# Patient Record
Sex: MALE | Race: WHITE | Employment: UNEMPLOYED | URBAN - METROPOLITAN AREA
[De-identification: names, ages, dates, MRNs, and addresses within clinical notes are randomized per-mention and may not be internally consistent; named-entity substitution may affect disease eponyms.]

---

## 2018-03-21 ENCOUNTER — OFFICE VISIT (OUTPATIENT)
Dept: INTERNAL MEDICINE CLINIC | Age: 48
End: 2018-03-21

## 2018-03-21 VITALS
SYSTOLIC BLOOD PRESSURE: 130 MMHG | OXYGEN SATURATION: 95 % | TEMPERATURE: 99 F | HEART RATE: 60 BPM | DIASTOLIC BLOOD PRESSURE: 78 MMHG | HEIGHT: 71 IN | RESPIRATION RATE: 14 BRPM | WEIGHT: 203.6 LBS | BODY MASS INDEX: 28.5 KG/M2

## 2018-03-21 DIAGNOSIS — Z76.89 ESTABLISHING CARE WITH NEW DOCTOR, ENCOUNTER FOR: Primary | ICD-10-CM

## 2018-03-21 DIAGNOSIS — R06.2 WHEEZING: ICD-10-CM

## 2018-03-21 DIAGNOSIS — Z77.090 HISTORY OF EXPOSURE TO ASBESTOS: ICD-10-CM

## 2018-03-21 DIAGNOSIS — F19.21 DRUG ADDICTION IN REMISSION (HCC): ICD-10-CM

## 2018-03-21 DIAGNOSIS — R06.01 ORTHOPNEA: ICD-10-CM

## 2018-03-21 DIAGNOSIS — R10.11 RUQ ABDOMINAL PAIN: ICD-10-CM

## 2018-03-21 DIAGNOSIS — Z76.89 ESTABLISHING CARE WITH NEW DOCTOR, ENCOUNTER FOR: ICD-10-CM

## 2018-03-21 DIAGNOSIS — R30.0 DYSURIA: ICD-10-CM

## 2018-03-21 DIAGNOSIS — R50.9 LOW GRADE FEVER: ICD-10-CM

## 2018-03-21 DIAGNOSIS — R09.89 CHEST RALES: ICD-10-CM

## 2018-03-21 DIAGNOSIS — R05.8 PRODUCTIVE COUGH: ICD-10-CM

## 2018-03-21 DIAGNOSIS — Z00.00 ENCOUNTER FOR ANNUAL HEALTH EXAMINATION: ICD-10-CM

## 2018-03-21 DIAGNOSIS — R10.32 LLQ ABDOMINAL PAIN: ICD-10-CM

## 2018-03-21 DIAGNOSIS — R06.00 DYSPNEA, UNSPECIFIED TYPE: ICD-10-CM

## 2018-03-21 PROBLEM — K70.30 ALCOHOLIC CIRRHOSIS (HCC): Status: ACTIVE | Noted: 2018-03-21

## 2018-03-21 PROBLEM — Z72.0 TOBACCO ABUSE: Status: ACTIVE | Noted: 2018-03-21

## 2018-03-21 PROBLEM — M54.50 CHRONIC LOW BACK PAIN: Status: ACTIVE | Noted: 2018-03-21

## 2018-03-21 PROBLEM — M17.0 PRIMARY OSTEOARTHRITIS OF BOTH KNEES: Status: ACTIVE | Noted: 2018-03-21

## 2018-03-21 PROBLEM — G89.29 CHRONIC LOW BACK PAIN: Status: ACTIVE | Noted: 2018-03-21

## 2018-03-21 RX ORDER — AMOXICILLIN AND CLAVULANATE POTASSIUM 875; 125 MG/1; MG/1
1 TABLET, FILM COATED ORAL 2 TIMES DAILY
Qty: 20 TAB | Refills: 0 | Status: SHIPPED | OUTPATIENT
Start: 2018-03-21 | End: 2018-03-31

## 2018-03-21 RX ORDER — BENZONATATE 100 MG/1
100 CAPSULE ORAL
Qty: 21 CAP | Refills: 0 | Status: SHIPPED | OUTPATIENT
Start: 2018-03-21 | End: 2018-03-28

## 2018-03-21 RX ORDER — ALBUTEROL SULFATE 90 UG/1
2 AEROSOL, METERED RESPIRATORY (INHALATION)
Qty: 1 INHALER | Refills: 2 | Status: SHIPPED | OUTPATIENT
Start: 2018-03-21 | End: 2019-07-31

## 2018-03-21 NOTE — PROGRESS NOTES
Keyonna Mendez is a 50 y.o.  male and presents with    Chief Complaint   Patient presents with    Establish Care    Cold Symptoms     Patient here for cough with green mucus in morning only, chest congestion, nasal driange, weakness. x 2 weeks        Subjective:  HPI   Mr. Shea Lead Hill presents today to establish care with a new provider. He has not been seen by a provider in some time. 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 He currently works second shift as a  and lives with his fiance. He was brought her by his mother in law who makes his appointments for him. He has complaints today of cold like symptoms that started 2 weeks ago. Started with rhinorrhea, sore throat, was out riding in the rain on motorcycle. OTC theraflu, mucinex, tylenol with no relief, seems like getting better then gets worse. Denies fever at home, hemoptysis, n/v, confusion, dizziness, however positive chills, sweats, coughing up green sputum, chest fullness/tightness, sob with exertion/rest, fatigue. Reports has had TB testing which was negative. Recovering prescription opioid/heroin addict of 6-10 years, clean over last 3-4 years. He is followed by [facility], psychiatry/psychology. He is currently taking Methadone 145 mg. Reports testing completed for Hepatitis B,C and HIV all negative. He is a current smoker of cigarretes, 3 a day, occasional marijuana. He has lost 18 lbs over about last 2 months, however he reports has stopped drinking soda. Additional Concerns: none     ROS   Review of Systems   Constitutional: Positive for chills, diaphoresis, fever, malaise/fatigue and weight loss. HENT: Positive for congestion. Negative for sinus pain. Eyes: Negative.     Respiratory: Positive for cough, sputum production, shortness of breath and stridor. Negative for hemoptysis and wheezing. Cardiovascular: Positive for orthopnea. Negative for chest pain, palpitations, claudication, leg swelling and PND. Chest heaviness with laying on back but relieved with side laying   Gastrointestinal: Positive for constipation and heartburn. Negative for abdominal pain, blood in stool, diarrhea, melena, nausea and vomiting. Genitourinary:        Difficulty getting stream going  positvie odor to urine related to Methadone   Musculoskeletal: Positive for back pain and joint pain. Negative for falls, myalgias and neck pain. Low back pain,   Bilateral knee pain, reports told needs knee replacements   Skin: Negative for itching and rash. Neurological: Negative for dizziness, tingling, tremors, sensory change, speech change, focal weakness, seizures, loss of consciousness, weakness and headaches. Endo/Heme/Allergies: Positive for polydipsia. Does not bruise/bleed easily. Psychiatric/Behavioral: Positive for depression. Negative for hallucinations, memory loss, substance abuse and suicidal ideas. The patient is not nervous/anxious and does not have insomnia. No Known Allergies    Current Outpatient Prescriptions   Medication Sig Dispense Refill    guaiFENesin (MUCINEX) 1,200 mg Ta12 ER tablet Take 1,200 mg by mouth daily.  ACETAMINOPHEN (TYLENOL PO) Take 2 Tabs by mouth daily.  METHADONE HCL (METHADONE PO) Take 145 mg by mouth. Indications: RMaxwell      albuterol (PROVENTIL HFA, VENTOLIN HFA, PROAIR HFA) 90 mcg/actuation inhaler Take 2 Puffs by inhalation every four (4) hours as needed for Wheezing. Indications: BRONCHOSPASM PREVENTION 1 Inhaler 2    benzonatate (TESSALON) 100 mg capsule Take 1 Cap by mouth three (3) times daily as needed for Cough for up to 7 days. 21 Cap 0    amoxicillin-clavulanate (AUGMENTIN) 875-125 mg per tablet Take 1 Tab by mouth two (2) times a day for 10 days.  20 Tab 0       Social History     Social History    Marital status: SINGLE     Spouse name: N/A    Number of children: N/A    Years of education: N/A     Occupational History    Not on file.      Social History Main Topics    Smoking status: Current Every Day Smoker     Packs/day: 0.00     Types: Cigarettes    Smokeless tobacco: Never Used      Comment: started age 13    Alcohol use Yes      Comment: once a week    Drug use: Yes     Special: Prescription      Comment: methadone    Sexual activity: No     Other Topics Concern    Not on file     Social History Narrative       Past Medical History:   Diagnosis Date    Chronic pain     Depression     GERD (gastroesophageal reflux disease)     Headache     resolved    Liver disease     early stages of cirrhosis       Past Surgical History:   Procedure Laterality Date    HX HEENT      tonsilectomy       Family History   Problem Relation Age of Onset    Arrhythmia Mother     Diabetes Mother     No Known Problems Father     No Known Problems Sister     Diabetes Brother     Psychiatric Disorder Brother        Objective:  Vitals:    03/21/18 1326   BP: 130/78   Pulse: 60   Resp: 14   Temp: 99 °F (37.2 °C)   TempSrc: Oral   SpO2: 95%   Weight: 203 lb 9.6 oz (92.4 kg)   Height: 5' 11\" (1.803 m)   PainSc:   0 - No pain       LABS   Results for orders placed or performed during the hospital encounter of 11/06/17   CBC WITH AUTOMATED DIFF   Result Value Ref Range    WBC 7.3 4.0 - 11.0 1000/mm3    RBC 5.12 3.80 - 5.70 M/uL    HGB 17.0 12.4 - 17.2 gm/dl    HCT 47.1 37.0 - 50.0 %    MCV 92.0 80.0 - 98.0 fL    MCH 33.2 23.0 - 34.6 pg    MCHC 36.1 (H) 30.0 - 36.0 gm/dl    PLATELET 432 (L) 448 - 450 1000/mm3    MPV 8.9 6.0 - 10.0 fL    RDW-SD 41.8 35.1 - 43.9      NRBC 0 0 - 0      IMMATURE GRANULOCYTES 0.4 0.0 - 3.0 %    NEUTROPHILS 64.0 34 - 64 %    LYMPHOCYTES 25.8 (L) 28 - 48 %    MONOCYTES 7.5 1 - 13 %    EOSINOPHILS 1.9 0 - 5 %    BASOPHILS 0.4 0 - 3 %   POC TROPONIN-I   Result Value Ref Range Troponin-I 0.00 0.00 - 0.07 ng/ml   POC CHEM8   Result Value Ref Range    Sodium 141 136 - 145 mEq/L    Potassium 4.2 3.5 - 4.9 mEq/L    Chloride 103 98 - 107 mEq/L    CO2, TOTAL 24 21 - 32 mmol/L    Glucose 112 (H) 74 - 106 mg/dL    BUN 14 7 - 25 mg/dl    Creatinine 0.8 0.6 - 1.3 mg/dl    HCT 56 (H) 40 - 54 %    HGB 19.0 (H) 13.0 - 17.2 gm/dl    CALCIUM,IONIZED 4.60 4.40 - 5.40 mg/dL   EKG, 12 LEAD, INITIAL   Result Value Ref Range    Ventricular Rate 88 BPM    Atrial Rate 88 BPM    P-R Interval 142 ms    QRS Duration 88 ms    Q-T Interval 404 ms    QTC Calculation (Bezet) 488 ms    Calculated P Axis 54 degrees    Calculated R Axis 22 degrees    Calculated T Axis 66 degrees    Diagnosis       Normal sinus rhythm  Prolonged QT  When compared with ECG of 15-STEPHANI-2014 11:10,  Minimal criteria for Anterior infarct are no longer present  Criteria for Inferior infarct are no longer present  T wave inversion no longer evident in Inferior leads  Nonspecific T wave abnormality no longer evident in Anterior leads  Nonspecific T wave abnormality now evident in Lateral leads  Confirmed by Terese Vickers M.D., Christiano Nunez. ([id number]) on 11/7/2017 7:19:40 AM         TESTS  none    PE  Physical Exam   Constitutional: He is oriented to person, place, and time. He appears well-developed and well-nourished. No distress. HENT:   Head: Normocephalic and atraumatic. Nose: Nose normal.   Mouth/Throat: Oropharynx is clear and moist. No oropharyngeal exudate. Cerumen impaction bilat, TM not visible   Eyes: Conjunctivae and EOM are normal. Pupils are equal, round, and reactive to light. Right eye exhibits no discharge. Left eye exhibits no discharge. Neck: Normal range of motion. Neck supple. Cardiovascular: Normal rate, regular rhythm, normal heart sounds and intact distal pulses. No murmur heard. Pulmonary/Chest: Effort normal and breath sounds normal. No respiratory distress. He has no wheezes. He has no rales. He exhibits no tenderness. Abdominal: Soft. Bowel sounds are normal. He exhibits no distension and no mass. There is tenderness in the right upper quadrant and left lower quadrant. There is no rebound, no guarding and no CVA tenderness. Striae noted   Musculoskeletal: Normal range of motion. He exhibits no edema, tenderness or deformity. No crepitation, clicks, pops noted on examination today, FROM   Lymphadenopathy:     He has no cervical adenopathy. Neurological: He is alert and oriented to person, place, and time. He has normal reflexes. Coordination normal.   Skin: Skin is warm and dry. No rash noted. He is not diaphoretic. No erythema. No pallor. Psychiatric: He has a normal mood and affect. His behavior is normal. Judgment and thought content normal.   Disheveled, dirty, dressed appropriately   Vitals reviewed. Assessment/Plan:    1. Establish care with a new provider- CPE today. Labs today. Follow up in 3 weeks. 2. Early stages Cirrhosis/LLQ/RUQ pain- Labs ordered. CT abd/pelvis ordered. Hx of reported early stages of cirrhosis, IV drug abuse, prescription drug abuse. Denies other abdominal history. Pain not worse with movement only noted with deep palpation and deep inspiratory breath. Possible gallbladder, advancing cirrhosis. .. Will need GI referral in the future. 3. Recovery addict/Depression- Opioid abuse, PO and IV- Currently attending [address] [place] [address], receives psychology/psychiatry, Methadone 145 mg. UDS every 4 months per patient, reports has been screened for Hepatitis B,C, Tb, HIV all negative. Records requested. 4. Tobacco dependence- The patient was counseled on the dangers of tobacco use, and was advised to quit and would like to quit, he is working with psychology/psychiatry. Reviewed strategies to maximize success, including local smoking cessation programs([facility]). 5. Possible BPH- will work up further at next visit.     6. Bilateral osteoarthritis- knees, will address at future visits. 7. Chronic low back pain- tolerable. Monitor. 8. Respiratory infection viral versus bacterial- Chest xray ordered (low grade fever, symptoms ROS, 18 lb weight loss, smoker, hx asbestos reported), bilateral rales, wheezing. Hx asbestos. Labs ordered. Augmentin x 10 days. Albuterol with spacer Prn use cough/bronchospasm/wheeze/chest tightness. Tessalon PRN cough. Follow up sooner if worsening. Lab review: orders written for new lab studies as appropriate; see orders    Today's Visit:   Diagnoses and all orders for this visit:    1. Establishing care with new doctor, encounter for  -     XR CHEST PA LAT; Future  -     CBC WITH AUTOMATED DIFF; Future  -     METABOLIC PANEL, COMPREHENSIVE; Future  -     HEMOGLOBIN A1C WITH EAG; Future  -     LIPID PANEL; Future  -     MICROALBUMIN, UR, RAND W/ MICROALB/CREAT RATIO; Future  -     TSH 3RD GENERATION; Future  -     T4, FREE; Future  -     URINALYSIS W/ RFLX MICROSCOPIC; Future    2. Dyspnea, unspecified type  -     amoxicillin-clavulanate (AUGMENTIN) 875-125 mg per tablet; Take 1 Tab by mouth two (2) times a day for 10 days. 3. Wheezing  -     amoxicillin-clavulanate (AUGMENTIN) 875-125 mg per tablet; Take 1 Tab by mouth two (2) times a day for 10 days. 4. History of exposure to asbestos    5. Orthopnea  -     NT-PRO BNP; Future    6. LLQ abdominal pain  -     CT ABD PELV W WO CONT; Future    7. RUQ abdominal pain  -     LIPASE; Future  -     CT ABD PELV W WO CONT; Future    8. Productive cough  -     amoxicillin-clavulanate (AUGMENTIN) 875-125 mg per tablet; Take 1 Tab by mouth two (2) times a day for 10 days. 9. Chest rales  -     amoxicillin-clavulanate (AUGMENTIN) 875-125 mg per tablet; Take 1 Tab by mouth two (2) times a day for 10 days. 10. Low grade fever  -     amoxicillin-clavulanate (AUGMENTIN) 875-125 mg per tablet; Take 1 Tab by mouth two (2) times a day for 10 days.     Other orders  -     albuterol (PROVENTIL HFA, VENTOLIN HFA, PROAIR HFA) 90 mcg/actuation inhaler; Take 2 Puffs by inhalation every four (4) hours as needed for Wheezing. Indications: BRONCHOSPASM PREVENTION  -     benzonatate (TESSALON) 100 mg capsule; Take 1 Cap by mouth three (3) times daily as needed for Cough for up to 7 days. Health Maintenance: records requested    I have discussed the diagnosis with the patient and the intended plan as seen in the above orders. The patient has received an after-visit summary and questions were answered concerning future plans. I have discussed medication side effects and warnings with the patient as well. I have reviewed the plan of care with the patient, accepted their input and they are in agreement with the treatment goals. Follow-up Disposition:  Return in about 2 weeks (around 4/4/2018). More than 1/2 of this 60 minute visit was spent in counseling and coordination of care, as described above.     GRETEL Diaz  Internist of [address], [facility].  Phone: [phone]  Fax: [phone]

## 2018-03-21 NOTE — MR AVS SNAPSHOT
[facility] 
 
 
 [address], [address] 
[phone] Patient: Geovanni Early 
MRN: [id number] :1970 Visit Information Date & Time Provider Department Dept. Phone Encounter #  
 3/21/2018  1:30 PM Vidhya Meza NP [facility] [address] [place] [address] [phone] [id number] Follow-up Instructions Return in about 2 weeks (around 2018). Your Appointments 2018  8:00 AM  
Office Visit with Vidhya Meza NP [facility] [address] [place] [address] ([address]) Appt Note: 3 wk f/u  
 [address] [facility], [address] [address] [address] [facility]  
  
   
 [address], [address] Upcoming Health Maintenance Date Due Pneumococcal 19-64 Medium Risk (1 of 1 - PPSV23) 1987 Influenza Age 5 to Adult 2017 DTaP/Tdap/Td series (2 - Td) 2028 Allergies as of 3/21/2018  Review Complete On: 3/21/2018 By: Vidhya Meza NP No Known Allergies Current Immunizations  Never Reviewed Name Date Tdap 2018  2:15 AM  
  
 Not reviewed this visit You Were Diagnosed With   
  
 Codes Comments Establishing care with new doctor, encounter for    -  Primary ICD-10-CM: Z76.89 
ICD-9-CM: V65.8 Dyspnea, unspecified type     ICD-10-CM: R06.00 
ICD-9-CM: 786.09 Wheezing     ICD-10-CM: R06.2 ICD-9-CM: 786.07 History of exposure to asbestos     ICD-10-CM: Z77.090 
ICD-9-CM: V15.84 Orthopnea     ICD-10-CM: R06.01 
ICD-9-CM: 786.02   
 LLQ abdominal pain     ICD-10-CM: R10.32 
ICD-9-CM: 789.04   
 RUQ abdominal pain     ICD-10-CM: R10.11 ICD-9-CM: 789.01 Vitals BP Pulse Temp Resp Height(growth percentile) Weight(growth percentile) 130/78 (BP 1 Location: Left arm, BP Patient Position: Sitting) 60 99 °F (37.2 °C) (Oral) 14 5' 11\" (1.803 m) 203 lb 9.6 oz (92.4 kg) SpO2 BMI Smoking Status 95% 28.4 kg/m2 Current Every Day Smoker Vitals History BMI and BSA Data Body Mass Index Body Surface Area  
 28.4 kg/m 2 2.15 m 2 Preferred Pharmacy Pharmacy Name Phone [facility] [address] [facility] [place] 18 [phone] Your Updated Medication List  
  
   
This list is accurate as of 3/21/18  2:41 PM.  Always use your most recent med list.  
  
  
  
  
 albuterol 90 mcg/actuation inhaler Commonly known as:  PROVENTIL HFA, VENTOLIN HFA, PROAIR HFA Take 2 Puffs by inhalation every four (4) hours as needed for Wheezing. Indications: BRONCHOSPASM PREVENTION  
  
 benzonatate 100 mg capsule Commonly known as:  TESSALON Take 1 Cap by mouth three (3) times daily as needed for Cough for up to 7 days. METHADONE PO Take 145 mg by mouth. Indications: RMaxwell MUCINEX 1,200 mg Ta12 ER tablet Generic drug:  guaiFENesin Take 1,200 mg by mouth daily. TYLENOL PO Take 2 Tabs by mouth daily. Prescriptions Sent to Pharmacy Refills  
 albuterol (PROVENTIL HFA, VENTOLIN HFA, PROAIR HFA) 90 mcg/actuation inhaler 2 Sig: Take 2 Puffs by inhalation every four (4) hours as needed for Wheezing. Indications: BRONCHOSPASM PREVENTION Class: Normal  
 Pharmacy: [facility]  [facility] [address] [facility] [place],[facility] Ph #: [phone] Route: Inhalation  
 benzonatate (TESSALON) 100 mg capsule 0 Sig: Take 1 Cap by mouth three (3) times daily as needed for Cough for up to 7 days. Class: Normal  
 Pharmacy: [facility]  [employer] [address],[facility] Ph #: [phone] Route: Oral  
  
Follow-up Instructions Return in about 2 weeks (around 4/4/2018). To-Do List   
 03/21/2018 Lab:  CBC WITH AUTOMATED DIFF   
  
 03/21/2018 Imaging:  CT ABD PELV W WO CONT 03/21/2018 Lab:  HEMOGLOBIN A1C WITH EAG   
  
 03/21/2018 Lab:  LIPASE   
  
 03/21/2018 Lab:  LIPID PANEL   
  
 03/21/2018 Lab:  METABOLIC PANEL, COMPREHENSIVE   
  
 03/21/2018 Lab:  MICROALBUMIN, UR, RAND W/ MICROALB/CREAT RATIO   
  
 03/21/2018 Lab:  NT-PRO BNP   
  
 03/21/2018 Lab:  T4, FREE   
  
 03/21/2018 Lab:  TSH 3RD GENERATION   
  
 03/21/2018 Lab:  URINALYSIS W/ RFLX MICROSCOPIC   
  
 03/21/2018 Imaging:  XR CHEST PA LAT Introducing [facility]! Ciro Disla introduces [employer] patient portal. Now you can access parts of your medical record, email your doctor's office, and request medication refills online. 1. In your internet browser, go to [email or web address] 2. Click on the First Time User? Click Here link in the Sign In box. You will see the New Member Sign Up page. 3. Enter your [employer] Access Code exactly as it appears below. You will not need to use this code after youve completed the sign-up process. If you do not sign up before the expiration date, you must request a new code. · [employer] Access Code: [email or web address] Expires: 6/19/2018  2:39 PM 
 
4. Enter the last four digits of your Social Security Number (xxxx) and Date of Birth (mm/dd/yyyy) as indicated and click Submit. You will be taken to the next sign-up page. 5. Create a [employer] ID. This will be your [employer] login ID and cannot be changed, so think of one that is secure and easy to remember. 6. Create a [employer] password. You can change your password at any time. 7. Enter your Password Reset Question and Answer. This can be used at a later time if you forget your password. 8. Enter your e-mail address. You will receive e-mail notification when new information is available in [address] 9. Click Sign Up. You can now view and download portions of your medical record.  
10. Click the Download Summary menu link to download a portable copy of your medical information. If you have questions, please visit the Frequently Asked Questions section of the [employer] website. Remember, [employer] is NOT to be used for urgent needs. For medical emergencies, dial 911. Now available from your iPhone and Android! Please provide this summary of care documentation to your next provider. Your primary care clinician is listed as Osiel Bhatia. If you have any questions after today's visit, please call [phone].

## 2018-03-21 NOTE — LETTER
NOTIFICATION RETURN TO WORK / SCHOOL 
 
3/21/2018 2:20 PM 
 
Mr. Abby Meadows 
[address] [place] [address] To Whom It May Concern: 
 
Ramirez Palencia is currently under the care of Veronica Veloz. He will return to work/school on: 3/22/18 If there are questions or concerns please have the patient contact our office. Sincerely, Christiana Loja NP

## 2018-03-21 NOTE — PROGRESS NOTES
1. Have you been to the ER, urgent care clinic or hospitalized since your last visit? NO.     2. Have you seen or consulted any other health care providers outside of the [address] [place] [address] since your last visit (Include any pap smears or colon screening)? NO      Do you have an Advanced Directive? NO    Would you like information on Advanced Directives?  NO

## 2018-03-23 ENCOUNTER — TELEPHONE (OUTPATIENT)
Dept: INTERNAL MEDICINE CLINIC | Age: 48
End: 2018-03-23

## 2018-03-23 NOTE — LETTER
3/29/2018 2:21 PM 
 
Mr. Ja Aguilrea 
[address] [address] We have not been able to reach you by phone. We want to inform you that your recent lab work returned without significant pathology,however,there were calcium oxalate crystals in your urine which could indicate a possible kidney stone. We are aware your CT scan is scheduled for April 2nd, please be sure you keep your appointment for this. Also, you have not obtained your chest x-ray as of today. Please be sure you complete the chest x-ray as well at your earliest convenience. You can go to [address] [place] [address] or [facility] without an appointment or an order as the order will be in your chart. Please call the office if you have any questions. Thank you. Sincerely, Ricky Mejia NP

## 2018-03-23 NOTE — TELEPHONE ENCOUNTER
Please let Irineo Syeda Barrientos know all labs returned without significant pathology however did see calcium oxalate crystals in urine. May have kidney stone. I see the CT abd/pelvis is scheduled for 4/2 so this will tell us if there is a stone. Also the Chest Xray is still pending, please have completed at earliest convenience.

## 2018-04-02 ENCOUNTER — HOSPITAL ENCOUNTER (OUTPATIENT)
Dept: CT IMAGING | Age: 48
Discharge: HOME | End: 2018-04-02
Attending: NURSE PRACTITIONER
Payer: COMMERCIAL

## 2018-04-02 DIAGNOSIS — R10.32 LLQ ABDOMINAL PAIN: ICD-10-CM

## 2018-04-02 DIAGNOSIS — R10.11 RUQ ABDOMINAL PAIN: ICD-10-CM

## 2018-04-02 LAB — CREAT UR-MCNC: 0.8 MG/DL (ref 0.6–1.3)

## 2018-04-02 PROCEDURE — 74011636320 HC RX REV CODE- 636/320: Performed by: NURSE PRACTITIONER

## 2018-04-02 PROCEDURE — 82565 ASSAY OF CREATININE: CPT

## 2018-04-02 PROCEDURE — 74178 CT ABD&PLV WO CNTR FLWD CNTR: CPT

## 2018-04-02 RX ADMIN — IOPAMIDOL 100 ML: 612 INJECTION, SOLUTION INTRAVENOUS at 11:31

## 2018-04-05 ENCOUNTER — TELEPHONE (OUTPATIENT)
Dept: INTERNAL MEDICINE CLINIC | Age: 48
End: 2018-04-05

## 2018-04-05 DIAGNOSIS — R93.5 ABNORMAL CT OF THE ABDOMEN: Primary | ICD-10-CM

## 2018-04-05 NOTE — TELEPHONE ENCOUNTER
Please inform . Marybeth Gentile that his CT scan does not show anything emergent however I would like him to be seen by a GI specialist. Please triage him for appendicitis- RLQ pain, anorexia, n/v, fevers. If no change from my previous note then follow up with GI, if change then send to ER. If concerns please discuss with Dr. Eugene Laguna.

## 2018-04-05 NOTE — TELEPHONE ENCOUNTER
Upon review of the Ct abd/pelvis please have this patient seen by General surgery as soon as possible for abnormal appendix on CT scan.  If he is complaining of increased abdominal pain, fevers, change in stool have report

## 2018-04-12 ENCOUNTER — OFFICE VISIT (OUTPATIENT)
Dept: INTERNAL MEDICINE CLINIC | Age: 48
End: 2018-04-12

## 2018-04-12 ENCOUNTER — TELEPHONE (OUTPATIENT)
Dept: INTERNAL MEDICINE CLINIC | Age: 48
End: 2018-04-12

## 2018-04-12 VITALS
SYSTOLIC BLOOD PRESSURE: 130 MMHG | HEIGHT: 71 IN | WEIGHT: 202 LBS | DIASTOLIC BLOOD PRESSURE: 70 MMHG | HEART RATE: 60 BPM | RESPIRATION RATE: 14 BRPM | TEMPERATURE: 99.1 F | BODY MASS INDEX: 28.28 KG/M2 | OXYGEN SATURATION: 95 %

## 2018-04-12 DIAGNOSIS — Z76.89 ESTABLISHING CARE WITH NEW DOCTOR, ENCOUNTER FOR: ICD-10-CM

## 2018-04-12 DIAGNOSIS — R63.0 ANOREXIA: ICD-10-CM

## 2018-04-12 DIAGNOSIS — K21.9 GASTROESOPHAGEAL REFLUX DISEASE, ESOPHAGITIS PRESENCE NOT SPECIFIED: ICD-10-CM

## 2018-04-12 DIAGNOSIS — R06.02 SHORTNESS OF BREATH: ICD-10-CM

## 2018-04-12 DIAGNOSIS — R10.9 ABDOMINAL PAIN, UNSPECIFIED ABDOMINAL LOCATION: Primary | ICD-10-CM

## 2018-04-12 DIAGNOSIS — R63.4 ABNORMAL WEIGHT LOSS: ICD-10-CM

## 2018-04-12 DIAGNOSIS — R10.9 ABDOMINAL PAIN, UNSPECIFIED ABDOMINAL LOCATION: ICD-10-CM

## 2018-04-12 DIAGNOSIS — R06.2 WHEEZING: ICD-10-CM

## 2018-04-12 DIAGNOSIS — R11.0 NAUSEA: ICD-10-CM

## 2018-04-12 DIAGNOSIS — R30.0 DYSURIA: ICD-10-CM

## 2018-04-12 NOTE — PROGRESS NOTES
1. Have you been to the ER, urgent care clinic or hospitalized since your last visit? NO.     2. Have you seen or consulted any other health care providers outside of the [address] [place] [address] since your last visit (Include any pap smears or colon screening)? NO      Do you have an Advanced Directive? NO    Would you like information on Advanced Directives?  NO

## 2018-04-12 NOTE — MR AVS SNAPSHOT
[address] 
[phone] Patient: Conchita Sanchez 
MRN: [id number] :1970 Visit Information Date & Time Provider Department Dept. Phone Encounter #  
 2018  9:30 AM Jennifer Wylie, ERAN [facility] [phone] [id number] Upcoming Health Maintenance Date Due Pneumococcal 19-64 Medium Risk (1 of 1 - PPSV23) 2018* Influenza Age 5 to Adult 10/12/2019* DTaP/Tdap/Td series (2 - Td) 2028 *Topic was postponed. The date shown is not the original due date. Allergies as of 2018  Review Complete On: 2018 By: Arturo Teran No Known Allergies Current Immunizations  Never Reviewed Name Date Tdap 2018  2:15 AM  
  
 Not reviewed this visit You Were Diagnosed With   
  
 Codes Comments Abdominal pain, unspecified abdominal location    -  Primary ICD-10-CM: R10.9 ICD-9-CM: 789.00 Anorexia     ICD-10-CM: R63.0 ICD-9-CM: 783.0 Nausea     ICD-10-CM: R11.0 ICD-9-CM: 787.02 Abnormal weight loss     ICD-10-CM: R63.4 ICD-9-CM: 783.21 Gastroesophageal reflux disease, esophagitis presence not specified     ICD-10-CM: K21.9 ICD-9-CM: 530.81 Shortness of breath     ICD-10-CM: R06.02 
ICD-9-CM: 786.05 Wheezing     ICD-10-CM: R06.2 ICD-9-CM: 786.07 Establishing care with new doctor, encounter for     ICD-10-CM: Z76.89 
ICD-9-CM: V65.8 Dysuria     ICD-10-CM: R30.0 ICD-9-CM: 842. 1 Vitals BP Pulse Temp Resp Height(growth percentile) Weight(growth percentile) 130/70 (BP 1 Location: Left arm, BP Patient Position: Sitting) 60 99.1 °F (37.3 °C) (Oral) 14 5' 11\" (1.803 m) 202 lb (91.6 kg) SpO2 BMI Smoking Status 95% 28.17 kg/m2 Current Every Day Smoker Vitals History BMI and BSA Data Body Mass Index Body Surface Area  
 28.17 kg/m 2 2.14 m 2 Preferred Pharmacy Pharmacy Name Phone [facility] [address] [employer] Dashawn Hensley 71 25 [facility] [address] 18 [phone] Your Updated Medication List  
  
   
This list is accurate as of 4/12/18 10:30 AM.  Always use your most recent med list.  
  
  
  
  
 albuterol 90 mcg/actuation inhaler Commonly known as:  PROVENTIL HFA, VENTOLIN HFA, PROAIR HFA Take 2 Puffs by inhalation every four (4) hours as needed for Wheezing. Indications: BRONCHOSPASM PREVENTION  
  
 beclomethasone 40 mcg/actuation Aero Commonly known as:  QVAR Take 1 Puff by inhalation two (2) times a day. METHADONE PO Take 145 mg by mouth. Indications: RMaxwell MUCINEX 1,200 mg Ta12 ER tablet Generic drug:  guaiFENesin Take 1,200 mg by mouth daily. TYLENOL PO Take 2 Tabs by mouth daily. Prescriptions Sent to Pharmacy Refills  
 beclomethasone (QVAR) 40 mcg/actuation aero 2 Sig: Take 1 Puff by inhalation two (2) times a day. Class: Normal  
 [facility]: [facility] [address] Ph #: [phone] Route: Inhalation We Performed the Following CBC WITH AUTOMATED DIFF [28747 CPT(R)] HEMOGLOBIN A1C WITH EAG [01680 CPT(R)] HEPATITIS PANEL, ACUTE [24050 CPT(R)] LIPASE [id number] CPT(R)] LIPID PANEL [94860 CPT(R)] METABOLIC PANEL, COMPREHENSIVE [95049 CPT(R)] MICROALBUMIN, UR, RAND W/ MICROALB/CREAT RATIO [id number] CPT(R)] NT-PRO BNP [id number] CPT(R)] PSA, DIAGNOSTIC (PROSTATE SPECIFIC AG) [id number] CPT(R)] T4, FREE [id number] CPT(R)] TSH 3RD GENERATION [60343 CPT(R)] URINALYSIS W/ RFLX MICROSCOPIC [12956 CPT(R)] To-Do List   
 04/12/2018 Lab:  HEPATITIS PANEL, ACUTE Patient Instructions Ranitidine (Zantac, Zantac 150, Zantac 300, Zantac 75) - (By mouth) Why this medicine is used: Treats ulcers, heartburn, and gastroesophageal reflux disease (GERD). Contact a nurse or doctor right away if you have: · Blistering, peeling, red skin rash · Unusual bleeding, bruising, weakness · Dark urine or pale stools · Nausea, vomiting, loss of appetite, stomach pain · Yellow skin or eyes Common side effects: 
· Constipation, diarrhea 
· Headache © 2017 [facility] Information is for End User's use only and may not be sold, redistributed or otherwise used for commercial purposes. Loratadine (By mouth) Loratadine (ajj-T-bf-christy) Treats allergy symptoms and hives. Brand Name(s): Alavert, Allergy, Allergy Relief, Brite-Life Allergy Relief, Children's Claritin, Children's Claritin Allergy, Children's Claritin Chewables, Children's Loratadine, Children's Loratadine Allergy, Claritin, Claritin 24HR, Claritin Liqui-Gels, Claritin RediTabs, [facility]ighbor Loratadine, [facility] Children's Loratadine There may be other brand names for this medicine. When This Medicine Should Not Be Used: You should not use this medicine if you have had an allergic reaction to loratadine. Do not give any over-the-counter (OTC) cough and cold medicine to a baby or child under 3years old. Using these medicines in very young children might cause serious or possibly life-threatening side effects. How to Use This Medicine:  
Tablet, Dissolving Tablet, Liquid, Liquid Filled Capsule, Chewable Tablet · Your doctor will tell you how much of this medicine to use and how often. Do not use more medicine or use it more often than your doctor tells you to. · Follow the instructions on the medicine label if you are using this medicine without a prescription. · After you remove a rapidly disintegrating tablet (Reditab®) from the package, use it right away by putting the tablet on your tongue. The tablet will break up and dissolve quickly. You may take the tablet with or without water. · Measure the oral liquid medicine with a marked measuring spoon, oral syringe, or medicine cup. If a dose is missed: · If you miss a dose or forget to take your medicine, take it as soon as you can. If it is almost time for your next dose, wait until then to take the medicine and skip the missed dose. · Do not use extra medicine to make up for a missed dose. How to Store and Dispose of This Medicine: · Store the medicine at room temperature, away from heat and direct light. Do not freeze. · Ask your pharmacist, doctor, or health caregiver about the best way to dispose of any outdated medicine or medicine no longer needed. · Keep all medicine out of the reach of children and never share your medicine with anyone. Drugs and Foods to Avoid: Ask your doctor or pharmacist before using any other medicine, including over-the-counter medicines, vitamins, and herbal products. Warnings While Using This Medicine: · Make sure your doctor knows if you are pregnant or breastfeeding, or if you have liver disease or kidney disease. Possible Side Effects While Using This Medicine:  
Call your doctor right away if you notice any of these side effects: · Allergic reaction: Itching or hives, swelling in face or hands, swelling or tingling in the mouth or throat, tightness in chest, trouble breathing · Extreme weakness · Fast or irregular heartbeat · Uncontrolled movements of the head, neck, eyes or tongue If you notice these less serious side effects, talk with your doctor: · Cough, sore throat, hoarseness · Drowsiness · Dry mouth 
· Headache · Nervousness · Red, irritated eyes If you notice other side effects that you think are caused by this medicine, tell your doctor. Call your doctor for medical advice about side effects. You may report side effects to FDA at 9-064-FDA-3874 © 2017 [facility] Information is for End User's use only and may not be sold, redistributed or otherwise used for commercial purposes. The above information is an  only. It is not intended as medical advice for individual conditions or treatments. Talk to your doctor, nurse or pharmacist before following any medical regimen to see if it is safe and effective for you. Introducing \[facility] [place] [facility]\"" & HEALTH SERVICES! Jose Membreno introduces [facility] patient portal. Now you can access parts of your medical record, email your doctor's office, and request medication refills online. 1. In your internet browser, go to [email or web address] 2. Click on the First Time User? Click Here link in the Sign In box. You will see the New Member Sign Up page. 3. Enter your [facility] Access Code exactly as it appears below. You will not need to use this code after youve completed the sign-up process. If you do not sign up before the expiration date, you must request a new code. · [facility] [facility] Access Code: [email or web address] Expires: 6/19/2018  2:39 PM 
 
4. Enter the last four digits of your Social Security Number (xxxx) and Date of Birth (mm/dd/yyyy) as indicated and click Submit. You will be taken to the next sign-up page. 5. Create a [facility] ID. This will be your [facility] [facility] login ID and cannot be changed, so think of one that is secure and easy to remember. 6. Create a [facility] password. You can change your password at any time. 7. Enter your Password Reset Question and Answer. This can be used at a later time if you forget your password. 8. Enter your e-mail address. You will receive e-mail notification when new information is available in [address] 9. Click Sign Up. You can now view and download portions of your medical record. 10. Click the Download Summary menu link to download a portable copy of your medical information.  
 
If you have questions, please visit the Frequently Asked Questions section of the [employer]. Remember, [employer] is NOT to be used for urgent needs. For medical emergencies, dial 911. Now available from your iPhone and Android! Please provide this summary of care documentation to your next provider. Your primary care clinician is listed as Kalyan Franksor. If you have any questions after today's visit, please call [phone].

## 2018-04-12 NOTE — PROGRESS NOTES
Barbara Russo is a 50 y.o.  male and presents with    Chief Complaint   Patient presents with    Follow-up     Patient here for 3 week follow up. 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 He currently works second shift as a  and lives with his fiance. He was brought here by his mother in law who makes his appointments for him.       Recovering prescription opioid/heroin addict of 6-10 years, clean over last 3-4 years. He is followed by [facility], psychiatry/psychology. He is currently taking Methadone 145 mg. Reports testing completed for Hepatitis B,C and HIV all negative.     He is a current smoker of cigarettes, 3 a day, occasional marijuana.       He reports a weight loss of about 60-70 lbs over the last year. He has lost 18 lbs since January. Additional symptoms include anorexia, intermittent nausea in am.  He figured weight loss related to improved diet. He reports has stopped drinking soda and cut out fast foods. Hx of acid reflux has not tried OTC medicatoin. History of asbestos exposure, IV and PO drug abuse, current everyday smoker. Additional Concerns: none     ROS   Review of Systems   Constitutional: Negative. HENT: Negative. Eyes: Negative. Respiratory: Positive for wheezing. Cardiovascular: Negative. Gastrointestinal: Negative. Genitourinary: Negative. Musculoskeletal: Negative. Skin: Negative. Neurological: Negative. No Known Allergies    Current Outpatient Prescriptions   Medication Sig Dispense Refill    beclomethasone (QVAR) 40 mcg/actuation aero Take 1 Puff by inhalation two (2) times a day. 1 Inhaler 2    ACETAMINOPHEN (TYLENOL PO) Take 2 Tabs by mouth daily.  METHADONE HCL (METHADONE PO) Take 145 mg by mouth.  Indications: RMaxwell      albuterol (PROVENTIL HFA, VENTOLIN HFA, PROAIR HFA) 90 mcg/actuation inhaler Take 2 Puffs by inhalation every four (4) hours as needed for Wheezing. Indications: BRONCHOSPASM PREVENTION 1 Inhaler 2    guaiFENesin (MUCINEX) 1,200 mg Ta12 ER tablet Take 1,200 mg by mouth daily. Social History     Social History    Marital status: SINGLE     Spouse name: N/A    Number of children: N/A    Years of education: N/A     Occupational History    Not on file. Social History Main Topics    Smoking status: Current Every Day Smoker     Packs/day: 0.00     Types: Cigarettes    Smokeless tobacco: Never Used      Comment: started age 13    Alcohol use Yes      Comment: once a week    Drug use: Yes     Special: Prescription      Comment: methadone    Sexual activity: No     Other Topics Concern    Not on file     Social History Narrative       Past Medical History:   Diagnosis Date    Chronic pain     Depression     GERD (gastroesophageal reflux disease)     Headache     resolved    Liver disease     early stages of cirrhosis       Past Surgical History:   Procedure Laterality Date    HX HEENT      tonsilectomy       Family History   Problem Relation Age of Onset    Arrhythmia Mother     Diabetes Mother     No Known Problems Father     No Known Problems Sister     Diabetes Brother     Psychiatric Disorder Brother        Objective:  Vitals:    04/12/18 0926   BP: 130/70   Pulse: 60   Resp: 14   Temp: 99.1 °F (37.3 °C)   TempSrc: Oral   SpO2: 95%   Weight: 202 lb (91.6 kg)   Height: 5' 11\" (1.803 m)   PainSc:   0 - No pain       LABS   Results for orders placed or performed during the hospital encounter of 04/02/18   POC CREATININE   Result Value Ref Range    Creatinine, POC 0.8 0.6 - 1.3 MG/DL    GFRAA, POC >60 >60 ml/min/1.73m2    GFRNA, POC >60 >60 ml/min/1.73m2       TESTS  11/2017 Chest Xray- Atelectasis of lung.     PE  Physical Exam   Constitutional: He is oriented to person, place, and time. He appears well-developed and well-nourished. No distress. HENT:   Head: Normocephalic and atraumatic. Eyes: Conjunctivae and EOM are normal. Pupils are equal, round, and reactive to light. Cardiovascular: Normal rate, regular rhythm, normal heart sounds and intact distal pulses. Pulmonary/Chest: Effort normal and breath sounds normal.   Abdominal: Soft. Bowel sounds are normal.   Neurological: He is alert and oriented to person, place, and time. Skin: Skin is warm and dry. He is not diaphoretic. Psychiatric: He has a normal mood and affect. His behavior is normal. Judgment and thought content normal.   Vitals reviewed. Assessment/Plan:    1. Patient here for follow up to labs and imaging-reviewed with patient. He did not complete the labs last visit and reports not able to complete chest xray last visit as HBV reported no order. Labs completed today in office. Low grade fever and generalized wheezing auscultated today. Prescribed Qvar 40 mcg BID and continue Albuterol PRN. May need PFTs in future, current everyday smoker, hx asbestos, reports TB testing completed at [facility] clinic was negative, working on getting records. Xray has been ordered and patient reports will complete by this weekend. Follow up to be determined after labs and imaging resulted. 2. Abdominal pain/abnormal CT abd/pelvis/anorexia/significant weight loss- GI referral has already been placed. Lab review: orders written for new lab studies as appropriate; see orders    Today's Visit:   Diagnoses and all orders for this visit:    1. Abdominal pain, unspecified abdominal location  -     HEPATITIS PANEL, ACUTE; Future  -     beclomethasone (QVAR) 40 mcg/actuation aero; Take 1 Puff by inhalation two (2) times a day.   -     CBC WITH AUTOMATED DIFF  -     METABOLIC PANEL, COMPREHENSIVE  -     HEMOGLOBIN A1C WITH EAG  -     LIPID PANEL  -     MICROALBUMIN, UR, RAND W/ MICROALB/CREAT RATIO  -     TSH 3RD GENERATION  -     T4, FREE  -     URINALYSIS W/ RFLX MICROSCOPIC  -     NT-PRO BNP  -     LIPASE    2. Anorexia  -     HEPATITIS PANEL, ACUTE; Future  -     beclomethasone (QVAR) 40 mcg/actuation aero; Take 1 Puff by inhalation two (2) times a day. -     CBC WITH AUTOMATED DIFF  -     METABOLIC PANEL, COMPREHENSIVE  -     HEMOGLOBIN A1C WITH EAG  -     LIPID PANEL  -     MICROALBUMIN, UR, RAND W/ MICROALB/CREAT RATIO  -     TSH 3RD GENERATION  -     T4, FREE  -     URINALYSIS W/ RFLX MICROSCOPIC  -     NT-PRO BNP  -     LIPASE    3. Nausea  -     HEPATITIS PANEL, ACUTE; Future  -     beclomethasone (QVAR) 40 mcg/actuation aero; Take 1 Puff by inhalation two (2) times a day. -     CBC WITH AUTOMATED DIFF  -     METABOLIC PANEL, COMPREHENSIVE  -     HEMOGLOBIN A1C WITH EAG  -     LIPID PANEL  -     MICROALBUMIN, UR, RAND W/ MICROALB/CREAT RATIO  -     TSH 3RD GENERATION  -     T4, FREE  -     URINALYSIS W/ RFLX MICROSCOPIC  -     NT-PRO BNP  -     LIPASE    4. Abnormal weight loss  -     HEPATITIS PANEL, ACUTE; Future  -     beclomethasone (QVAR) 40 mcg/actuation aero; Take 1 Puff by inhalation two (2) times a day. -     CBC WITH AUTOMATED DIFF  -     METABOLIC PANEL, COMPREHENSIVE  -     HEMOGLOBIN A1C WITH EAG  -     LIPID PANEL  -     MICROALBUMIN, UR, RAND W/ MICROALB/CREAT RATIO  -     TSH 3RD GENERATION  -     T4, FREE  -     URINALYSIS W/ RFLX MICROSCOPIC  -     NT-PRO BNP  -     LIPASE    5. Gastroesophageal reflux disease, esophagitis presence not specified  -     HEPATITIS PANEL, ACUTE; Future  -     beclomethasone (QVAR) 40 mcg/actuation aero; Take 1 Puff by inhalation two (2) times a day. -     CBC WITH AUTOMATED DIFF  -     METABOLIC PANEL, COMPREHENSIVE  -     HEMOGLOBIN A1C WITH EAG  -     LIPID PANEL  -     MICROALBUMIN, UR, RAND W/ MICROALB/CREAT RATIO  -     TSH 3RD GENERATION  -     T4, FREE  -     URINALYSIS W/ RFLX MICROSCOPIC  -     NT-PRO BNP  -     LIPASE    6.  Shortness of breath  - HEPATITIS PANEL, ACUTE; Future  -     beclomethasone (QVAR) 40 mcg/actuation aero; Take 1 Puff by inhalation two (2) times a day. -     CBC WITH AUTOMATED DIFF  -     METABOLIC PANEL, COMPREHENSIVE  -     HEMOGLOBIN A1C WITH EAG  -     LIPID PANEL  -     MICROALBUMIN, UR, RAND W/ MICROALB/CREAT RATIO  -     TSH 3RD GENERATION  -     T4, FREE  -     URINALYSIS W/ RFLX MICROSCOPIC  -     NT-PRO BNP  -     LIPASE    7. Wheezing  -     HEPATITIS PANEL, ACUTE; Future  -     beclomethasone (QVAR) 40 mcg/actuation aero; Take 1 Puff by inhalation two (2) times a day. -     CBC WITH AUTOMATED DIFF  -     METABOLIC PANEL, COMPREHENSIVE  -     HEMOGLOBIN A1C WITH EAG  -     LIPID PANEL  -     MICROALBUMIN, UR, RAND W/ MICROALB/CREAT RATIO  -     TSH 3RD GENERATION  -     T4, FREE  -     URINALYSIS W/ RFLX MICROSCOPIC  -     NT-PRO BNP  -     LIPASE    8. Establishing care with new doctor, encounter for  -     HEPATITIS PANEL, ACUTE; Future  -     beclomethasone (QVAR) 40 mcg/actuation aero; Take 1 Puff by inhalation two (2) times a day. -     CBC WITH AUTOMATED DIFF  -     METABOLIC PANEL, COMPREHENSIVE  -     HEMOGLOBIN A1C WITH EAG  -     LIPID PANEL  -     MICROALBUMIN, UR, RAND W/ MICROALB/CREAT RATIO  -     TSH 3RD GENERATION  -     T4, FREE  -     URINALYSIS W/ RFLX MICROSCOPIC  -     NT-PRO BNP  -     LIPASE    9. Dysuria  -     PSA - PROSTATE SPECIFIC AG      Health Maintenance:   Pneumococcal vaccine offered and patient agreed to receive however the clinic is out of stock at this time, will address at follow up  Influenza vaccine- out of season. I have discussed the diagnosis with the patient and the intended plan as seen in the above orders. The patient has received an after-visit summary and questions were answered concerning future plans. I have discussed medication side effects and warnings with the patient as well.  I have reviewed the plan of care with the patient, accepted their input and they are in agreement with the treatment goals. Follow-up Disposition: Not on File   More than 1/2 of this 30 minute visit was spent in counseling and coordination of care, as described above.     GRETEL Walton  Internist of [address], [address] [place] [address].  Phone: [phone]  Fax: [phone]

## 2018-04-12 NOTE — LETTER
4/12/2018 11:35 AM 
 
Mr. Nawaf Lombardo 
[address] [facility] [address] Dear Mr. Maria De Jesus Anaya: 
 
Lola Sanderson tried to reach you! We have been unsuccessful in obtaining a copy of your medical records from [facility]. Please contact them to get a copy of your record so we can continue with your care. Sincerely, Bruce Lopez NP

## 2018-04-12 NOTE — TELEPHONE ENCOUNTER
[facility] needs patient records from [facility]. Release was faxed 3/22/18 but no response received so far. I tried to call their office at [phone] yesterday and was unable to get connected with anyone. Fax has again been tried several times and will not go through now. [facility] patient told her he would get his records if we need him to so please contact patient and see if he can get those records.

## 2018-04-12 NOTE — TELEPHONE ENCOUNTER
Per pharmacy, QVAR is no longer available. The pharmacy asks you to consider changing to [address]. Please sign the pended order if appropriate. Requested Prescriptions     Pending Prescriptions Disp Refills    beclomethasone dipropionate (QVAR REDIHALER) 40 mcg/actuation HFAb inhaler 1 Inhaler 2     Sig: Take 1 Puff by inhalation two (2) times a day.

## 2018-04-12 NOTE — TELEPHONE ENCOUNTER
Tried to call pt. Cell number is a woman's cell and home number just disconnects. I sent letter to pt asking him to get a copy of the record.

## 2018-04-12 NOTE — PATIENT INSTRUCTIONS
Ranitidine (Zantac, Zantac 150, Zantac 300, Zantac 75) - (By mouth)   Why this medicine is used:   Treats ulcers, heartburn, and gastroesophageal reflux disease (GERD). Contact a nurse or doctor right away if you have:  · Blistering, peeling, red skin rash  · Unusual bleeding, bruising, weakness  · Dark urine or pale stools  · Nausea, vomiting, loss of appetite, stomach pain  · Yellow skin or eyes     Common side effects:  · Constipation, diarrhea  · Headache  © 2017 [facility] Information is for End User's use only and may not be sold, redistributed or otherwise used for commercial purposes. Loratadine (By mouth)   Loratadine (dak-H-hx-christy)  Treats allergy symptoms and hives. Brand Name(s): Alavert, Allergy, Allergy Relief, Brite-Life Allergy Relief, Children's Claritin, Children's Claritin Allergy, Children's Claritin Chewables, Children's Loratadine, Children's Loratadine Allergy, Claritin, Claritin 24HR, Claritin Liqui-Gels, Claritin RediTabs, [facility] Loratadine, [facility] Children's Loratadine   There may be other brand names for this medicine. When This Medicine Should Not Be Used: You should not use this medicine if you have had an allergic reaction to loratadine. Do not give any over-the-counter (OTC) cough and cold medicine to a baby or child under 3years old. Using these medicines in very young children might cause serious or possibly life-threatening side effects. How to Use This Medicine:   Tablet, Dissolving Tablet, Liquid, Liquid Filled Capsule, Chewable Tablet  · Your doctor will tell you how much of this medicine to use and how often. Do not use more medicine or use it more often than your doctor tells you to. · Follow the instructions on the medicine label if you are using this medicine without a prescription. · After you remove a rapidly disintegrating tablet (Reditab®) from the package, use it right away by putting the tablet on your tongue.  The tablet will break up and dissolve quickly. You may take the tablet with or without water. · Measure the oral liquid medicine with a marked measuring spoon, oral syringe, or medicine cup. If a dose is missed:   · If you miss a dose or forget to take your medicine, take it as soon as you can. If it is almost time for your next dose, wait until then to take the medicine and skip the missed dose. · Do not use extra medicine to make up for a missed dose. How to Store and Dispose of This Medicine:   · Store the medicine at room temperature, away from heat and direct light. Do not freeze. · Ask your pharmacist, doctor, or health caregiver about the best way to dispose of any outdated medicine or medicine no longer needed. · Keep all medicine out of the reach of children and never share your medicine with anyone. Drugs and Foods to Avoid:      Ask your doctor or pharmacist before using any other medicine, including over-the-counter medicines, vitamins, and herbal products. Warnings While Using This Medicine:   · Make sure your doctor knows if you are pregnant or breastfeeding, or if you have liver disease or kidney disease. Possible Side Effects While Using This Medicine:   Call your doctor right away if you notice any of these side effects:  · Allergic reaction: Itching or hives, swelling in face or hands, swelling or tingling in the mouth or throat, tightness in chest, trouble breathing  · Extreme weakness  · Fast or irregular heartbeat  · Uncontrolled movements of the head, neck, eyes or tongue  If you notice these less serious side effects, talk with your doctor:   · Cough, sore throat, hoarseness  · Drowsiness  · Dry mouth  · Headache  · Nervousness  · Red, irritated eyes  If you notice other side effects that you think are caused by this medicine, tell your doctor. Call your doctor for medical advice about side effects.  You may report side effects to FDA at 3-379-DSP-7083  © 2017 [facility] Information is for End User's use only and may not be sold, redistributed or otherwise used for commercial purposes. The above information is an  only. It is not intended as medical advice for individual conditions or treatments. Talk to your doctor, nurse or pharmacist before following any medical regimen to see if it is safe and effective for you.

## 2018-04-13 LAB
A-G RATIO,AGRAT: 1.5 RATIO (ref 1.1–2.6)
ABSOLUTE LYMPHOCYTE COUNT, 10803: 1.6 K/UL (ref 1–4.8)
ALBUMIN SERPL-MCNC: 4.4 G/DL (ref 3.5–5)
ALP SERPL-CCNC: 73 U/L (ref 25–115)
ALT SERPL-CCNC: 28 U/L (ref 5–40)
ANION GAP SERPL CALC-SCNC: 13 MMOL/L
AST SERPL W P-5'-P-CCNC: 41 U/L (ref 10–37)
AVG GLU, 10930: 99 MG/DL (ref 91–123)
BASOPHILS # BLD: 0 K/UL (ref 0–0.2)
BASOPHILS NFR BLD: 0 % (ref 0–2)
BILIRUB SERPL-MCNC: 0.7 MG/DL (ref 0.2–1.2)
BILIRUB UR QL: NEGATIVE
BNP SERPL-MCNC: 48 PG/ML
BUN SERPL-MCNC: 14 MG/DL (ref 6–22)
CALCIUM SERPL-MCNC: 9.2 MG/DL (ref 8.4–10.4)
CHLORIDE SERPL-SCNC: 99 MMOL/L (ref 98–110)
CHOLEST SERPL-MCNC: 115 MG/DL (ref 110–200)
CO2 SERPL-SCNC: 28 MMOL/L (ref 20–32)
CREAT SERPL-MCNC: 0.7 MG/DL (ref 0.5–1.2)
CREATININE, URINE: 98 MG/DL
EOSINOPHIL # BLD: 0.2 K/UL (ref 0–0.5)
EOSINOPHIL NFR BLD: 4 % (ref 0–6)
ERYTHROCYTE [DISTWIDTH] IN BLOOD BY AUTOMATED COUNT: 13.8 % (ref 10–15.5)
GFRAA, 66117: >60
GFRNA, 66118: >60
GLOBULIN,GLOB: 3 G/DL (ref 2–4)
GLUCOSE SERPL-MCNC: 109 MG/DL (ref 70–99)
GLUCOSE UR QL: NEGATIVE MG/DL
GRANULOCYTES,GRANS: 64 % (ref 40–75)
HBA1C MFR BLD HPLC: 5.1 % (ref 4.8–5.9)
HCT VFR BLD AUTO: 43 % (ref 39.3–51.6)
HCV AB SER IA-ACNC: POSITIVE
HDLC SERPL-MCNC: 2.7 MG/DL (ref 0–5)
HDLC SERPL-MCNC: 43 MG/DL (ref 40–59)
HEP A AB,IGM, 006734: NORMAL
HEP B CORE AB,IGM, 016881: NORMAL
HEP B SURFACE AG SCRN, 006510: NORMAL
HGB BLD-MCNC: 14.1 G/DL (ref 13.1–17.2)
HGB UR QL STRIP: NEGATIVE
KETONES UR QL STRIP.AUTO: NEGATIVE MG/DL
LDLC SERPL CALC-MCNC: 55 MG/DL (ref 50–99)
LEUKOCYTE ESTERASE: NEGATIVE
LIPASE SERPL-CCNC: 28 U/L (ref 7–60)
LYMPHOCYTES, LYMLT: 26 % (ref 20–45)
MCH RBC QN AUTO: 33 PG (ref 26–34)
MCHC RBC AUTO-ENTMCNC: 33 G/DL (ref 31–36)
MCV RBC AUTO: 100 FL (ref 80–95)
MICROALB/CREAT RATIO, 140286: NORMAL MCG/MG OF CREATININE (ref 0–30)
MICROALBUMIN,URINE RANDOM 140054: <12 UG/ML (ref 0.1–17)
MONOCYTES # BLD: 0.4 K/UL (ref 0.1–1)
MONOCYTES NFR BLD: 6 % (ref 3–12)
NEUTROPHILS # BLD AUTO: 3.9 K/UL (ref 1.8–7.7)
NITRITE UR QL STRIP.AUTO: NEGATIVE
PH UR STRIP: 5 PH (ref 5–8)
PLATELET # BLD AUTO: 101 K/UL (ref 140–440)
PMV BLD AUTO: 9.8 FL (ref 9–13)
POTASSIUM SERPL-SCNC: 4.3 MMOL/L (ref 3.5–5.5)
PROT SERPL-MCNC: 7.4 G/DL (ref 6.4–8.3)
PROT UR QL STRIP: NEGATIVE MG/DL
PSA SERPL-MCNC: 0.37 NG/ML
RBC # BLD AUTO: 4.3 M/UL (ref 3.8–5.8)
SODIUM SERPL-SCNC: 140 MMOL/L (ref 133–145)
SP GR UR: 1.02 (ref 1–1.03)
T4 FREE SERPL-MCNC: 1.1 NG/DL (ref 0.9–1.8)
TRIGL SERPL-MCNC: 84 MG/DL (ref 40–149)
TSH SERPL DL<=0.005 MIU/L-ACNC: 1.29 MCU/ML (ref 0.27–4.2)
UROBILINOGEN UR STRIP-MCNC: <2 MG/DL
VLDLC SERPL CALC-MCNC: 17 MG/DL (ref 8–30)
WBC # BLD AUTO: 6.1 K/UL (ref 4–11)

## 2018-04-16 ENCOUNTER — TELEPHONE (OUTPATIENT)
Dept: INTERNAL MEDICINE CLINIC | Age: 48
End: 2018-04-16

## 2018-04-16 NOTE — TELEPHONE ENCOUNTER
Please inform Mr. Freddy Leigh that his labs returned showing a low platelet count and positive for hepatitis C. We had already discussed referral to GI and this would be the next step, please schedule with them as soon as possible to discuss options. Also please complete the chest Xray.

## 2018-04-16 NOTE — LETTER
4/18/2018 3:35 PM 
 
Mr. Juan Stacy 
[address] [address] We have not been able to reach you by phone. Please call our office for your recent test results. Thank you. Sincerely, Wendy Edmonds NP

## 2018-06-13 ENCOUNTER — APPOINTMENT (OUTPATIENT)
Dept: GENERAL RADIOLOGY | Age: 48
End: 2018-06-13
Attending: NURSE PRACTITIONER
Payer: COMMERCIAL

## 2018-06-13 ENCOUNTER — HOSPITAL ENCOUNTER (EMERGENCY)
Age: 48
Discharge: HOME | End: 2018-06-14
Attending: EMERGENCY MEDICINE
Payer: COMMERCIAL

## 2018-06-13 VITALS
TEMPERATURE: 97.7 F | SYSTOLIC BLOOD PRESSURE: 117 MMHG | OXYGEN SATURATION: 98 % | DIASTOLIC BLOOD PRESSURE: 75 MMHG | HEART RATE: 60 BPM | RESPIRATION RATE: 16 BRPM
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
## 2018-06-13 DIAGNOSIS — S42.022A CLOSED DISPLACED FRACTURE OF SHAFT OF LEFT CLAVICLE, INITIAL ENCOUNTER: Primary | ICD-10-CM

## 2018-06-13 DIAGNOSIS — T14.8XXA ABRASION: ICD-10-CM

## 2018-06-13 PROCEDURE — 74011250637 HC RX REV CODE- 250/637: Performed by: NURSE PRACTITIONER

## 2018-06-13 PROCEDURE — 99283 EMERGENCY DEPT VISIT LOW MDM: CPT

## 2018-06-13 PROCEDURE — 73000 X-RAY EXAM OF COLLAR BONE: CPT

## 2018-06-13 PROCEDURE — 73030 X-RAY EXAM OF SHOULDER: CPT

## 2018-06-13 PROCEDURE — 73630 X-RAY EXAM OF FOOT: CPT

## 2018-06-13 RX ORDER — OXYCODONE AND ACETAMINOPHEN 5; 325 MG/1; MG/1
2 TABLET ORAL
Status: COMPLETED | OUTPATIENT
Start: 2018-06-13 | End: 2018-06-13

## 2018-06-13 RX ORDER — OXYCODONE AND ACETAMINOPHEN 5; 325 MG/1; MG/1
1 TABLET ORAL
Qty: 15 TAB | Refills: 0 | Status: SHIPPED | OUTPATIENT
Start: 2018-06-13 | End: 2019-07-31

## 2018-06-13 RX ORDER — NALOXONE HYDROCHLORIDE 4 MG/.1ML
SPRAY NASAL
Qty: 2 EACH | Refills: 0 | Status: SHIPPED | OUTPATIENT
Start: 2018-06-13 | End: 2019-07-31

## 2018-06-13 RX ORDER — IBUPROFEN 800 MG/1
800 TABLET ORAL
Qty: 20 TAB | Refills: 0 | Status: SHIPPED | OUTPATIENT
Start: 2018-06-13 | End: 2018-06-20

## 2018-06-13 RX ADMIN — OXYCODONE HYDROCHLORIDE AND ACETAMINOPHEN 2 TABLET: 5; 325 TABLET ORAL at 20:32
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
## 2018-06-14 NOTE — ED NOTES
Pt. Had abrasions on foot and knee cleaned, covered, and wrapped. Pt given d/c papers. Pt had narcan use and administration explained. He had no further questions. Pt appreciative of care.
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
## 2018-06-27 PROBLEM — S42.002A CLOSED LEFT CLAVICULAR FRACTURE: Status: ACTIVE | Noted: 2018-06-27

## 2019-07-31 ENCOUNTER — HOSPITAL ENCOUNTER (EMERGENCY)
Age: 49
Discharge: HOME | End: 2019-07-31
Attending: EMERGENCY MEDICINE
Payer: MEDICAID

## 2019-07-31 ENCOUNTER — APPOINTMENT (OUTPATIENT)
Dept: GENERAL RADIOLOGY | Age: 49
End: 2019-07-31
Attending: PHYSICIAN ASSISTANT
Payer: MEDICAID

## 2019-07-31 VITALS
DIASTOLIC BLOOD PRESSURE: 56 MMHG | OXYGEN SATURATION: 95 % | RESPIRATION RATE: 19 BRPM | TEMPERATURE: 98.7 F | HEART RATE: 85 BPM | SYSTOLIC BLOOD PRESSURE: 118 MMHG

## 2019-07-31 DIAGNOSIS — J45.41 MODERATE PERSISTENT REACTIVE AIRWAY DISEASE WITH ACUTE EXACERBATION: Primary | ICD-10-CM

## 2019-07-31 LAB
ATRIAL RATE: 63 BPM
CALCULATED P AXIS, ECG09: 14 DEGREES
CALCULATED R AXIS, ECG10: 39 DEGREES
CALCULATED T AXIS, ECG11: 38 DEGREES
DIAGNOSIS, 93000: NORMAL
P-R INTERVAL, ECG05: 122 MS
Q-T INTERVAL, ECG07: 428 MS
QRS DURATION, ECG06: 88 MS
QTC CALCULATION (BEZET), ECG08: 437 MS
VENTRICULAR RATE, ECG03: 63 BPM

## 2019-07-31 PROCEDURE — 99285 EMERGENCY DEPT VISIT HI MDM: CPT

## 2019-07-31 PROCEDURE — 74011000250 HC RX REV CODE- 250: Performed by: PHYSICIAN ASSISTANT

## 2019-07-31 PROCEDURE — 71046 X-RAY EXAM CHEST 2 VIEWS: CPT

## 2019-07-31 PROCEDURE — 74011636637 HC RX REV CODE- 636/637: Performed by: PHYSICIAN ASSISTANT

## 2019-07-31 PROCEDURE — 94640 AIRWAY INHALATION TREATMENT: CPT

## 2019-07-31 PROCEDURE — 77030029684 HC NEB SM VOL KT MONA -A

## 2019-07-31 PROCEDURE — 93005 ELECTROCARDIOGRAM TRACING: CPT

## 2019-07-31 RX ORDER — AZITHROMYCIN 250 MG/1
TABLET, FILM COATED ORAL
Qty: 6 TAB | Refills: 0 | Status: SHIPPED | OUTPATIENT
Start: 2019-07-31 | End: 2019-07-31

## 2019-07-31 RX ORDER — PREDNISONE 10 MG/1
TABLET ORAL
Qty: 21 TAB | Refills: 0 | Status: SHIPPED | OUTPATIENT
Start: 2019-07-31 | End: 2019-08-14 | Stop reason: ALTCHOICE

## 2019-07-31 RX ORDER — IPRATROPIUM BROMIDE AND ALBUTEROL SULFATE 2.5; .5 MG/3ML; MG/3ML
3 SOLUTION RESPIRATORY (INHALATION)
Status: COMPLETED | OUTPATIENT
Start: 2019-07-31 | End: 2019-07-31

## 2019-07-31 RX ORDER — AZITHROMYCIN 250 MG/1
TABLET, FILM COATED ORAL
Qty: 6 TAB | Refills: 0 | Status: SHIPPED | OUTPATIENT
Start: 2019-07-31 | End: 2019-08-14 | Stop reason: ALTCHOICE

## 2019-07-31 RX ORDER — PREDNISONE 20 MG/1
60 TABLET ORAL
Status: COMPLETED | OUTPATIENT
Start: 2019-07-31 | End: 2019-07-31

## 2019-07-31 RX ORDER — BENZONATATE 200 MG/1
200 CAPSULE ORAL
Qty: 30 CAP | Refills: 0 | Status: SHIPPED | OUTPATIENT
Start: 2019-07-31 | End: 2019-08-07

## 2019-07-31 RX ORDER — ALBUTEROL SULFATE 90 UG/1
2 AEROSOL, METERED RESPIRATORY (INHALATION)
Qty: 1 INHALER | Refills: 0 | Status: SHIPPED | OUTPATIENT
Start: 2019-07-31 | End: 2019-08-14 | Stop reason: ALTCHOICE

## 2019-07-31 RX ADMIN — IPRATROPIUM BROMIDE AND ALBUTEROL SULFATE 3 ML: .5; 3 SOLUTION RESPIRATORY (INHALATION) at 13:03

## 2019-07-31 RX ADMIN — PREDNISONE 60 MG: 20 TABLET ORAL at 10:45

## 2019-07-31 RX ADMIN — IPRATROPIUM BROMIDE AND ALBUTEROL SULFATE 3 ML: .5; 3 SOLUTION RESPIRATORY (INHALATION) at 10:45

## 2019-07-31 NOTE — DISCHARGE INSTRUCTIONS
SPECIFIC PATIENT INSTRUCTIONS FROM THE PROVIDER  WHO TREATED YOU IN THE ER TODAY:    Prednisone as prescribed until finished. Use your albuterol inhaler, if prescribed, and/or home nebulizer machine (if you have one) for wheezing. Avoid triggers if possible. Finish antibiotics Z-pack as directed. FOLLOW UP APPOINTMENT:    Make an appointment with pulmonologist Dr. Emil Morton for further evaluation and treatment   Follow up with your primary doctor as needed for recheck your symptoms and for any other general concerns about your health.     Return for any urgent concerns or worsening of condition(s)

## 2019-07-31 NOTE — ED PROVIDER NOTES
[facility]  [facility] EMERGENCY DEPT      46 y.o. male with noted past medical history who presents to the emergency department c/o chest tightness, SOB, wheezing, NP cough present chronically but getting much worse since yesterday. 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 Now works for [employer] and keeps failing pulmonary tests. Notes that yesterday felt like \"somewone is choking me and sitting on my chest\". Pt was rx with Advair in the past per records but does not recall that. History of hospitalization/intubation: no  Exposure to smoking: no, +former smoker, quit 1 year ago  Exposure to commercial dust, chemicals, toxic materials: yes    No other complaints.      Past Medical History:   Diagnosis Date    Chronic pain     Depression     GERD (gastroesophageal reflux disease)     Headache     resolved    Liver disease     early stages of cirrhosis       No Known Allergies    Social History     Socioeconomic History    Marital status: SINGLE     Spouse name: Not on file    Number of children: Not on file    Years of education: Not on file    Highest education level: Not on file   Occupational History    Not on file   Social Needs    Financial resource strain: Not on file    Food insecurity:     Worry: Not on file     Inability: Not on file    Transportation needs:     Medical: Not on file     Non-medical: Not on file   Tobacco Use    Smoking status: Current Every Day Smoker     Packs/day: 0.00     Types: Cigarettes    Smokeless tobacco: Never Used    Tobacco comment: started age 13   Substance and Sexual Activity    Alcohol use: Yes     Comment: once a week    Drug use: Yes     Types: Prescription     Comment: methadone    Sexual activity: Never     Partners: Female   Lifestyle    Physical activity:     Days per week: Not on file     Minutes per session: Not on file    Stress: Not on file   Relationships    Social connections:     Talks on phone: Not on file     Gets together: Not on file     Attends [employer] service: Not on file     Active member of club or organization: Not on file     Attends meetings of clubs or organizations: Not on file     Relationship status: Not on file    Intimate partner violence:     Fear of current or ex partner: Not on file     Emotionally abused: Not on file     Physically abused: Not on file     Forced sexual activity: Not on file   Other Topics Concern    Not on file   Social History Narrative    Not on file       REVIEW OF SYSTEMS:  Constitutional:  Negative for fever, chills, diaphoresis. HENT:  Negative for congestion, ear pain, sore throat, rhinorrhea, neck pain/stiffness. Respiratory:  + wheezing, chest tightness, cough Negative for stridor. Cardiovascular:  Negative for chest pain or palpitations. Gastrointestinal:  Negative for abd pain, nausea, vomiting or diarrhea. Genitourinary:  Negative. Musculoskeletal:  Negative. Skin:  Negative for cyanosis or pallor. Neurological: Negative. All other systems are negative    Visit Vitals  /70   Pulse (!) 52   Temp 98.7 °F (37.1 °C)   Resp 20   SpO2 96%       PHYSICAL EXAM:    Nursing notes and vital signs are reviewed    CONSTITUTIONAL:  Alert, in no apparent distress;  well developed;  well nourished. HEAD:  Normocephalic, atraumatic. EYES:  EOMI. Non-icteric sclera. Normal conjunctiva. ENTM:  Nose:  no rhinorrhea. Throat:  no erythema or exudate, mucous membranes moist. Ears: normal TM's bilaterally  NECK:  No JVD. Supple, No rigidity, erythema, edema, muscular or midline tenderness     RESPIRATORY:  Expiratory wheezes but good air movement. +rales in lower lobes. +deep cough. Speaking in full sentences. No tripoding    CARDIOVASCULAR:  Regular rate and rhythm (bradycardia in 50s is nl for this pt per prior records). No murmurs, rubs, or gallops.  No edema, no cords, no asymmetry  GI:  Normal bowel sounds, abdomen soft and non-tender. MS:  Grossly normal inspection. No edema, no calf tenderness. Distal pulses intact. NEURO:  Moves all four extremities, and grossly normal motor exam.  SKIN:  No rashes;  Normal for age. PSYCH:  Alert and normal affect. Abnormal lab results from this emergency department encounter:  Labs Reviewed - No data to display    Lab values for this patient within approximately the last 12 hours:  Recent Results (from the past 12 hour(s))   EKG, 12 LEAD, INITIAL    Collection Time: 07/31/19  9:15 AM   Result Value Ref Range    Ventricular Rate 63 BPM    Atrial Rate 63 BPM    P-R Interval 122 ms    QRS Duration 88 ms    Q-T Interval 428 ms    QTC Calculation (Bezet) 437 ms    Calculated P Axis 14 degrees    Calculated R Axis 39 degrees    Calculated T Axis 38 degrees    Diagnosis       Normal sinus rhythm  Normal ECG  No previous ECGs available  Confirmed by Lianne Farfan MD, Ilana Cortez ([id number]) on 7/31/2019 12:28:53 PM         Radiologist and cardiologist interpretations if available at time of this note:  XR CHEST PA LAT   Final Result   IMPRESSION:      1. No acute airspace disease. 2.  Subtle peripheral septal line thickening. 3.  Healed old left clavicle fracture with ORIF. Medication(s) ordered for patient during this emergency visit encounter:  Medications   albuterol-ipratropium (DUO-NEB) 2.5 MG-0.5 MG/3 ML (3 mL Nebulization Given 7/31/19 1303)   predniSONE (DELTASONE) tablet 60 mg (60 mg Oral Given 7/31/19 1045)   albuterol-ipratropium (DUO-NEB) 2.5 MG-0.5 MG/3 ML (3 mL Nebulization Given 7/31/19 1045)       DDx:  viral vs bacterial URI, asthma exacerbation, COPD, bronchitis, PNE, PE, allergies, pneumothorax, bronchospasm, bronchoconstriction     ED COURSE:  Patient's breathing improved and wheezing resolved in the emergency department with the noted albuterol and atrovent HHN treatments. Patient's initial steroid therapy may have been started in the ED as well.   Patient is well and can be discharged home. IMPRESSION AND MEDICAL DECISION MAKING:  Based upon the patient's presentation with noted HPI and PE, along with the work up done in the emergency department, I believe that the patient is having an acute COPD exacerbation. Given severity of presentation and presence of rales, covered with abx. Consult:    1:36 PM   Discussed care with Caridad Hernandez MD ED attending. Standard discussion; including history of patients chief complaint, available diagnostic results, and treatment course. PLAN: agrees that abx would be appropriate. Referral to pulmonologist  Karen Shen PA-C         SPECIFIC PATIENT INSTRUCTIONS FROM THE PROVIDER  WHO TREATED YOU IN THE ER TODAY:    Prednisone as prescribed until finished. Use your albuterol inhaler, if prescribed, and/or home nebulizer machine (if you have one) for wheezing. Avoid triggers if possible. Finish antibiotics Z-pack as directed. FOLLOW UP APPOINTMENT:    Make an appointment with pulmonologist Dr. Aleta Rehman for further evaluation and treatment   Follow up with your primary doctor as needed for recheck your symptoms and for any other general concerns about your health. Return for any urgent concerns or worsening of condition(s)      Diagnosis:   1.  Moderate persistent reactive airway disease with acute exacerbation          Disposition: home    Follow-up Information     Follow up With Specialties Details Why Contact Info    Rey Corona NP Internal Medicine In 3 days for recheck of current symptoms [address] [facility]  [phone]      Kayce Campos MD Pulmonary Disease, Internal Medicine Schedule an appointment as soon as possible for a visit for further evaluation and treatment by PULMONOLOGY ([address]) [address] [facility]      [facility] EMERGENCY DEPT Emergency Medicine  As needed, If symptoms worsen  Rosalind Mohamud [id number]  [phone]          Patient's Medications   Start Taking    ALBUTEROL (PROVENTIL HFA, VENTOLIN HFA, PROAIR HFA) 90 MCG/ACTUATION INHALER    Take 2 Puffs by inhalation every four (4) hours as needed for Wheezing. AZITHROMYCIN (ZITHROMAX) 250 MG TABLET    Take two tablets today then one tablet daily    BENZONATATE (TESSALON) 200 MG CAPSULE    Take 1 Cap by mouth three (3) times daily as needed for Cough for up to 7 days. PREDNISONE (STERAPRED DS) 10 MG DOSE PACK    Follow package instructions. Continue Taking    ACETAMINOPHEN (TYLENOL PO)    Take 2 Tabs by mouth daily. BECLOMETHASONE DIPROPIONATE (QVAR REDIHALER) 40 MCG/ACTUATION HFAB INHALER    Take 1 Puff by inhalation two (2) times a day. METHADONE HCL (METHADONE PO)    Take 145 mg by mouth. Indications: RMaxwell   These Medications have changed    No medications on file   Stop Taking    ALBUTEROL (PROVENTIL HFA, VENTOLIN HFA, PROAIR HFA) 90 MCG/ACTUATION INHALER    Take 2 Puffs by inhalation every four (4) hours as needed for Wheezing. Indications: BRONCHOSPASM PREVENTION    NALOXONE (NARCAN) 4 MG/ACTUATION NASAL SPRAY    Use 1 spray intranasally into 1 nostril. Use a new Narcan nasal spray for subsequent doses and administer into alternating nostrils. May repeat every 2 to 3 minutes as needed. OXYCODONE-ACETAMINOPHEN (PERCOCET) 5-325 MG PER TABLET    Take 1 Tab by mouth every four (4) hours as needed for Pain for up to 20 doses. Max Daily Amount: 6 Tabs.

## 2019-07-31 NOTE — ED TRIAGE NOTES
Pt states he had an xray 5 years ago to get hired at the [employer] and was denied employment, he has had SOB since then. States last night was when the SOB worsened and he made the decision to come in to \"get checked out\". Feels like someone is choking him and sitting on his chest, pain 8/10. O2 sats 98% on RA, in NAD. Has a nonproductive cough.

## 2019-08-14 ENCOUNTER — OFFICE VISIT (OUTPATIENT)
Dept: INTERNAL MEDICINE CLINIC | Age: 49
End: 2019-08-14

## 2019-08-14 ENCOUNTER — TELEPHONE (OUTPATIENT)
Dept: INTERNAL MEDICINE CLINIC | Age: 49
End: 2019-08-14

## 2019-08-14 VITALS
DIASTOLIC BLOOD PRESSURE: 80 MMHG | SYSTOLIC BLOOD PRESSURE: 138 MMHG | RESPIRATION RATE: 14 BRPM | WEIGHT: 206 LBS | OXYGEN SATURATION: 99 % | HEART RATE: 60 BPM | TEMPERATURE: 98.6 F | HEIGHT: 71 IN | BODY MASS INDEX: 28.84 KG/M2

## 2019-08-14 DIAGNOSIS — J41.0 SIMPLE CHRONIC BRONCHITIS (HCC): Primary | ICD-10-CM

## 2019-08-14 DIAGNOSIS — F19.20: ICD-10-CM

## 2019-08-14 RX ORDER — PREDNISONE 20 MG/1
TABLET ORAL
Qty: 30 TAB | Refills: 0 | Status: SHIPPED | OUTPATIENT
Start: 2019-08-14

## 2019-08-14 NOTE — PROGRESS NOTES
Ngoc Dave 1970, is a 46 y.o. male, who is seen today for evaluation of coughing and wheezing, also problem with drug addiction. He is on a waiting list that is 2 months long getting into a drug addiction clinic, he has had problems with heroin and opioids in the past.  She is currently struggling trying to stay away from his drugs. He has arthritis in both knees. He also has had wheezing and coughing going on for several weeks and did not improve at all with albuterol steroid Dosepak and azithromycin prescribed in the emergency room 14 days ago. He quit smoking 3 years ago and used to smoke 1/2 to 1 pack/day. He is to be a  but is disabled at this point, apparently because of arthritis in these and perhaps other issues. Past Medical History:   Diagnosis Date    Chronic pain     Depression     GERD (gastroesophageal reflux disease)     Headache     resolved    Liver disease     early stages of cirrhosis     He is currently taking no medicine. Visit Vitals  /80 (BP 1 Location: Left arm, BP Patient Position: Sitting)   Pulse 60   Temp 98.6 °F (37 °C) (Oral)   Resp 14   Ht 5' 11\" (1.803 m)   Wt 206 lb (93.4 kg)   SpO2 99%   BMI 28.73 kg/m²     He appears comfortable currently and is not using accessory muscles of respiration. Lungs are clear to percussion. Quite good breath sounds with no crackles but with forced expiration he does have end expiratory wheeze and slightly prolonged expiratory phase. He has a loose cough but produces no sputum. Heart reveals a regular rhythm with no gallop click or rub. Extremities reveal no clubbing cyanosis or edema. Pulses are intact. Assessment: #1.   Probable COPD, is recently been treated with antibiotic and moderate dose steroids and albuterol without benefit, this point I will treat him with a little higher dose of steroids, 60 mg of prednisone daily for 5 days then 40 for 5 days then 20 for 5 days and we will get him into see a pulmonary specialist as soon as possible. #2.  Opioid addiction and history of heroin use, will see if we can get him into see Analilia Whalen. He will need follow-up with a primary care physician, I am not taking new patients and she had an [place] is no longer practicing in this area. Arnoldo Dowd MD FACP    Please note: This document has been produced using voice recognition software. Unrecognized errors in transcription may be present. This visit lasted 25 minutes, greater than 50% of the time spent counseling as above.

## 2019-08-14 NOTE — TELEPHONE ENCOUNTER
Pt was referred today by Dr Yolanda Gonsalez to see Dr Fior Mak for pain management - pt called the office and was told that they were not taking patients, they are going to be closing the location .  Can you refer him to a different

## 2019-08-22 ENCOUNTER — HOSPITAL ENCOUNTER (EMERGENCY)
Age: 49
Discharge: HOME | End: 2019-08-22
Attending: EMERGENCY MEDICINE

## 2019-08-22 VITALS
HEIGHT: 71 IN | TEMPERATURE: 98.1 F | BODY MASS INDEX: 28.84 KG/M2 | WEIGHT: 206 LBS | HEART RATE: 80 BPM | RESPIRATION RATE: 18 BRPM | DIASTOLIC BLOOD PRESSURE: 73 MMHG | OXYGEN SATURATION: 98 % | SYSTOLIC BLOOD PRESSURE: 134 MMHG

## 2019-08-22 DIAGNOSIS — Z76.0 MEDICATION REFILL: Primary | ICD-10-CM

## 2019-08-22 RX ORDER — ALBUTEROL SULFATE 90 UG/1
2 AEROSOL, METERED RESPIRATORY (INHALATION)
Qty: 1 INHALER | Refills: 0 | Status: SHIPPED | OUTPATIENT
Start: 2019-08-22

## 2019-08-22 NOTE — LETTER
NOTIFICATION RETURN TO WORK / SCHOOL 
 
8/22/2019 8:26 AM 
 
Mr. Karen Rodgers 
[address] [facility] [address] To Whom It May Concern: 
 
Karen Rodgers is currently under the care of [facility] EM[facility]GENCY DEPT. He will return to work/school on: 8/22/19 If there are questions or concerns please have the patient contact our office. Sincerely, Al Bell PA-C

## 2019-08-22 NOTE — DISCHARGE INSTRUCTIONS
Patient Education        Asthma in Adults: Care Instructions  Your Care Instructions    During an asthma attack, your airways swell and narrow as a reaction to certain things (triggers). This makes it hard to breathe. You may be able to prevent asthma attacks if you avoid the things that set off your asthma symptoms. Keeping your asthma under control and treating symptoms before they get bad can help you avoid severe attacks. If you can control your asthma, you may be able to do all of your normal daily activities. You may also avoid asthma attacks and trips to the hospital.  Follow-up care is a key part of your treatment and safety. Be sure to make and go to all appointments, and call your doctor if you are having problems. It's also a good idea to know your test results and keep a list of the medicines you take. How can you care for yourself at home? · Follow your asthma action plan so you can manage your symptoms at home. An asthma action plan will help you prevent and control airway reactions and will tell you what to do during an asthma attack. If you do not have an asthma action plan, work with your doctor to build one. · Take your asthma medicine exactly as prescribed. Medicine plays an important role in controlling asthma. Talk to your doctor right away if you have any questions about what to take and how to take it. ? Use your quick-relief medicine when you have symptoms of an attack. Quick-relief medicine often is an albuterol inhaler. Some people need to use quick-relief medicine before they exercise. ? Take your controller medicine every day, not just when you have symptoms. Controller medicine is usually an inhaled corticosteroid. The goal is to prevent problems before they occur. Do not use your controller medicine to try to treat an attack that has already started. It does not work fast enough to help.   ? If your doctor prescribed corticosteroid pills to use during an attack, take them as directed. They may take hours to work, but they may shorten the attack and help you breathe better. ? Keep your quick-relief medicine with you at all times. · Talk to your doctor before using other medicines. Some medicines, such as aspirin, can cause asthma attacks in some people. · Check yourself for asthma symptoms to know which step to follow in your action plan. Watch for things like being short of breath, having chest tightness, coughing, and wheezing. Also notice if symptoms wake you up at night or if you get tired quickly when you exercise. · If you have a peak flow meter, use it to check how well you are breathing. This can help you predict when an asthma attack is going to occur. Then you can take medicine to prevent the asthma attack or make it less severe. · See your doctor regularly. These visits will help you learn more about asthma and what you can do to control it. Your doctor will monitor your treatment to make sure the medicine is helping you. · Keep track of your asthma attacks and your treatment. After you have had an attack, write down what triggered it, what helped end it, and any concerns you have about your asthma action plan. Take your diary when you see your doctor. You can then review your asthma action plan and decide if it is working. · Do not smoke or allow others to smoke around you. Avoid smoky places. Smoking makes asthma worse. If you need help quitting, talk to your doctor about stop-smoking programs and medicines. These can increase your chances of quitting for good. · Learn what triggers an asthma attack for you, and avoid the triggers when you can. Common triggers include colds, smoke, air pollution, dust, pollen, mold, pets, cockroaches, stress, and cold air. · Avoid colds and the flu. Get a pneumococcal vaccine shot. If you have had one before, ask your doctor whether you need a second dose. Get a flu vaccine every fall.  If you must be around people with colds or the flu, wash your hands often. When should you call for help? Call 911 anytime you think you may need emergency care. For example, call if:    · You have severe trouble breathing.    Call your doctor now or seek immediate medical care if:    · Your symptoms do not get better after you have followed your asthma action plan.     · You cough up yellow, dark brown, or bloody mucus (sputum).    Watch closely for changes in your health, and be sure to contact your doctor if:    · Your coughing and wheezing get worse.     · You need to use quick-relief medicine on more than 2 days a week (unless it is just for exercise).     · You need help figuring out what is triggering your asthma attacks. Where can you learn more? Go to http://rubina-jose.info/. Enter P597 in the search box to learn more about \"Asthma in Adults: Care Instructions. \"  Current as of: September 5, 2018  Content Version: 12.1  © 0296-2681 Healthwise, [employer]. Care instructions adapted under license by [employer] (which disclaims liability or warranty for this information). If you have questions about a medical condition or this instruction, always ask your healthcare professional. [address] any warranty or liability for your use of this information.

## 2019-08-23 NOTE — ED PROVIDER NOTES
EMERGENCY DEPARTMENT HISTORY AND PHYSICAL EXAM    Date: 8/22/2019  Patient Name: Breanna Underwood    History of Presenting Illness     No chief complaint on file. History Provided By: patient        Additional History (Context): Breanna Underwood is a 57-year-old male here for medication refill. Patient states he is currently out of his inhaler work is requesting that he has it refilled before he goes back. Patient has no symptoms. No cough fever, wheezing or shortness of breath. PCP: Janet Grace MD    Current Outpatient Medications   Medication Sig Dispense Refill    albuterol (PROVENTIL HFA, VENTOLIN HFA, PROAIR HFA) 90 mcg/actuation inhaler Take 2 Puffs by inhalation every four (4) hours as needed for Wheezing. 1 Inhaler 0    predniSONE (DELTASONE) 20 mg tablet 3 tablets by mouth daily for 5 days, then 2 tablets daily for 5 days then 1 tablet daily for 5 days. 30 Tab 0    ACETAMINOPHEN (TYLENOL PO) Take 2 Tabs by mouth daily.          Past History     Past Medical History:  Past Medical History:   Diagnosis Date    Chronic pain     Depression     GERD (gastroesophageal reflux disease)     Headache     resolved    Liver disease     early stages of cirrhosis       Past Surgical History:  Past Surgical History:   Procedure Laterality Date    HX HEENT      tonsilectomy       Family History:  Family History   Problem Relation Age of Onset    Arrhythmia Mother     Diabetes Mother     No Known Problems Father     No Known Problems Sister     Diabetes Brother     Psychiatric Disorder Brother        Social History:  Social History     Tobacco Use    Smoking status: Current Every Day Smoker     Packs/day: 0.00     Types: Cigarettes    Smokeless tobacco: Never Used    Tobacco comment: started age 13   Substance Use Topics    Alcohol use: Yes     Comment: once a week    Drug use: Yes     Types: Prescription     Comment: methadone       Allergies:  No Known Allergies      Review of Systems Review of Systems   Constitutional: Negative for chills and fever. HENT: Negative for nasal congestion, sore throat, rhinorrhea  Eyes: Negative. Respiratory: Negative for cough and negative for shortness of breath. Cardiovascular: Negative for chest pain and palpitations. Gastrointestinal: Negative for abdominal pain, constipation, diarrhea, nausea and vomiting. Genitourinary: Negative. Negative for difficulty urinating and flank pain. Musculoskeletal: Negative for back pain. Negative for gait problem and neck pain. Skin: Negative. Allergic/Immunologic: Negative. Neurological: Negative for dizziness, weakness, numbness and headaches. Psychiatric/Behavioral: Negative. All other systems reviewed and are negative. All Other Systems Negative  Physical Exam     Vitals:    08/22/19 0757   BP: 134/73   Pulse: 80   Resp: 18   Temp: 98.1 °F (36.7 °C)   SpO2: 98%   Weight: 93.4 kg (206 lb)   Height: 5' 11\" (1.803 m)     Physical Exam   Constitutional: He is oriented to person, place, and time. He appears well-developed and well-nourished. No distress. HENT:   Head: Normocephalic and atraumatic. Nose: Nose normal.   Eyes: Pupils are equal, round, and reactive to light. Conjunctivae and EOM are normal.   Neck: Normal range of motion. Neck supple. Cardiovascular: Normal rate and regular rhythm. Pulmonary/Chest: Effort normal and breath sounds normal. No respiratory distress. He has no wheezes. He has no rales. Abdominal: Soft. Musculoskeletal: Normal range of motion. Neurological: He is alert and oriented to person, place, and time. No cranial nerve deficit. Coordination normal.   Skin: Skin is warm. No rash noted. He is not diaphoretic. Psychiatric: He has a normal mood and affect. His behavior is normal.   Nursing note and vitals reviewed. Diagnostic Study Results     Labs -   No results found for this or any previous visit (from the past 12 hour(s)).     Radiologic Studies -   No orders to display     CT Results  (Last 48 hours)    None        CXR Results  (Last 48 hours)    None            Medical Decision Making   I am the first provider for this patient. I reviewed the vital signs, available nursing notes, past medical history, past surgical history, family history and social history. Vital Signs-Reviewed the patient's vital signs. Records Reviewed: Nursing notes, old medical records and any previous labs, imaging, visits, consultations pertinent to patient care    Procedures:  Procedures    Provider Notes (Medical Decision Making): Well-appearing 75-year-old male, asymptomatic, no wheezing, lungs clear to auscultation and no signs stable will refill inhaler so patient can go back to work. MED RECONCILIATION:  No current facility-administered medications for this encounter. Current Outpatient Medications   Medication Sig    albuterol (PROVENTIL HFA, VENTOLIN HFA, PROAIR HFA) 90 mcg/actuation inhaler Take 2 Puffs by inhalation every four (4) hours as needed for Wheezing.  predniSONE (DELTASONE) 20 mg tablet 3 tablets by mouth daily for 5 days, then 2 tablets daily for 5 days then 1 tablet daily for 5 days.  ACETAMINOPHEN (TYLENOL PO) Take 2 Tabs by mouth daily. Disposition:  home    DISCHARGE NOTE:     Pt has been reexamined. Patient has no new complaints, changes, or physical findings. Care plan outlined and precautions discussed. Discussed proper way to take medications. Discussed treatment plan, return precautions, symptomatic relief, and expected time to improvement. All questions answered. Patient is stable for discharge and outpatient management. Patient is ready to go home.     Follow-up Information     Follow up With Specialties Details Why Contact Info    Canelo Farrar MD [facility]   [address] [place] [address] [address]  [address] [place] [address]  [phone]      [facility] EMERGENCY DEPT Emergency Medicine   [address] [facility]  [phone]          Discharge Medication List as of 8/22/2019  8:25 AM      START taking these medications    Details   albuterol (PROVENTIL HFA, VENTOLIN HFA, PROAIR HFA) 90 mcg/actuation inhaler Take 2 Puffs by inhalation every four (4) hours as needed for Wheezing., Print, Disp-1 Inhaler, R-0         CONTINUE these medications which have NOT CHANGED    Details   predniSONE (DELTASONE) 20 mg tablet 3 tablets by mouth daily for 5 days, then 2 tablets daily for 5 days then 1 tablet daily for 5 days. , Normal, Disp-30 Tab, R-0      ACETAMINOPHEN (TYLENOL PO) Take 2 Tabs by mouth daily. , Historical Med                   Diagnosis     Clinical Impression:   1. Medication refill            Dictation disclaimer:  Please note that this dictation was completed with [employer], the [employer] voice recognition software. Quite often unanticipated grammatical, syntax, homophones, and other interpretive errors are inadvertently transcribed by the computer software. Please disregard these errors. Please excuse any errors that have escaped final proofreading.

## 2019-10-21 PROBLEM — D69.6 THROMBOCYTOPENIA (HCC): Status: ACTIVE | Noted: 2019-10-21

## 2019-10-21 PROBLEM — J84.10 PULMONARY FIBROSIS (HCC): Status: ACTIVE | Noted: 2019-10-21

## 2019-10-21 PROBLEM — R76.8 HEPATITIS C ANTIBODY POSITIVE IN BLOOD: Status: ACTIVE | Noted: 2019-10-21

## 2024-11-21 NOTE — LETTER
NOTIFICATION RETURN TO WORK / SCHOOL 
 
3/21/2018 2:20 PM 
 
Mr. Lovely Rocha 
[address] [place] [address] To Whom It May Concern: 
 
Ramirez Palencia is currently under the care of Veronica Veloz. He will return to work/school on: 3/23/18 If there are questions or concerns please have the patient contact our office. Sincerely, Jane Shetty NP 
 
                                
 
 electronic